# Patient Record
Sex: MALE | Race: WHITE
[De-identification: names, ages, dates, MRNs, and addresses within clinical notes are randomized per-mention and may not be internally consistent; named-entity substitution may affect disease eponyms.]

---

## 2017-04-26 NOTE — CT
EXAM DATE: 17



PATIENT'S AGE: 26





Patient: JOSE ELY



Facility: [place], [place]

Patient ID: [id number]

Site Patient ID: [id number].

Site Accession #: [id number].

: 1990

Study: CT Abdomen/Pelvis [id number]/25/2017 10:12:35 AM

Ordering Physician: Wai Maldonado



Final Report: 

INDICATION: CROHNS F/U



TECHNIQUE:

CT abdomen and pelvis acquired with IV contrast.



COMPARISON:

2015. 



FINDINGS:

Lower chest: Unremarkable. 

Liver: Unremarkable. 

Spleen: Unremarkable. 

Pancreas: Unremarkable. 

Gallbladder and bile ducts: Partially contracted. 

Kidneys: Nonobstructive intrarenal calculus within the upper pole of the left 
kidney. . 

Adrenal glands: Unremarkable. 

GI tract: Postsurgical changes along the ascending colon and rectosigmoid 
colon. Liquid stool throughout the colon. No evidence of obstruction. The 
appendix is not visualized. . 

Vascular structures: Negative. No sign of aneurysm. 

Lymph nodes: Unremarkable. 

Miscellaneous: Postsurgical changes within the ventral abdominal wall. Left fat 
containing periumbilical hernia. Subcutaneous gas within the left ventral 
abdominal wall which may be related to an injection site. Please correlate 
clinically. No free air or significant free fluid. 

Pelvic Organs: Unremarkable. 

Bones: Unremarkable for age. 



IMPRESSION:

1. Liquid stool throughout the colon. No evidence of obstruction. Findings are 
nonspecific, but can be seen with enterocolitis/gastroenteritis. 

2. Nonobstructing shadowing calculus within the upper pole of the left kidney.

3. Subcutaneous gas within the left ventral abdominal wall which may be related 
to an injection site. Please correlate clinically.



Dictated by Darin Blackwell MD @ 2017 1:58:54 AM





Dictated by: Darin Blackwell MD @ 2017 01:59:22

(Electronic Signature)



Report Signed by Proxy and Original Signed Document filed in the

Medical Record.
[facility]

## 2018-04-12 ENCOUNTER — HOSPITAL ENCOUNTER (OUTPATIENT)
Dept: HOSPITAL 56 - MW.SDS | Age: 28
Discharge: HOME | End: 2018-04-12
Attending: OTOLARYNGOLOGY
Payer: COMMERCIAL

## 2018-04-12 VITALS — DIASTOLIC BLOOD PRESSURE: 76 MMHG | SYSTOLIC BLOOD PRESSURE: 121 MMHG

## 2018-04-12 DIAGNOSIS — D18.01: Primary | ICD-10-CM

## 2018-04-12 DIAGNOSIS — Z88.0: ICD-10-CM

## 2018-04-12 DIAGNOSIS — F17.210: ICD-10-CM

## 2018-04-12 DIAGNOSIS — J30.9: ICD-10-CM

## 2018-04-12 PROCEDURE — 31237 NSL/SINS NDSC SURG BX POLYPC: CPT

## 2018-04-12 NOTE — PCM.HPR
H & P Addendum review





- H & P Addendum Review


Date of Original H & P: 03/30/18


Date Reviewed: 04/12/18


Time Reviewed: 07:50


Patient was Examined: No Changes

## 2018-04-12 NOTE — PCM48HPAN
Post Anesthesia Note





- EVALUATION WITHIN 48HRS OF ANESTHETIC


Vital Signs in Normal Range: Yes


Patient Participated in Evaluation: Yes


Respiratory Function Stable: Yes


Airway Patent: Yes


Cardiovascular Function Stable: Yes


Hydration Status Stable: Yes


Pain Control Satisfactory: Yes


Nausea and Vomiting Control Satisfactory: Yes


Mental Status Recovered: Yes


Resp Rate: 16


Temperature: 36.6 C

## 2018-04-12 NOTE — PCM.OPNOTE
- General Post-Op/Procedure Note


Date of Surgery/Procedure: 04/12/18


Operative Procedure(s): Endoscopic excision of Right nasal septal mass


Findings: 





2 cm X 1.5 cm firm pale mass in mid right nasal septum; base at the bony 

cartilaginous junction


Pre Op Diagnosis: Nasal septal hemangioma


Post-Op Diagnosis: Nasal septal hemangioma


Anesthesia Technique: General ET Tube


Primary Surgeon: Leanne Murdock


Anesthesia Provider: Saulo Heath


Pathology: 





Nasal septal mass


Fluid Replacement, Intraop: 400


EBL in mLs: 150


Condition: Good


Free Text/Narrative:: 





Procedure:


An infromed consent was obtained and the patient was brought back to operating 

room; time out was performed and anesthesia administered. A throat pack was 

placed and right nasal cavity was packed with an oxymetazoline 0.05% soaked 

pledget.


Part was prepped and draped in a standard sterile fashion.


A 0 degree nasal endoscope was used to visualize the mass after removal of the 

pledget. 2% lidocaine with 1: 100 000 epinephrine was injected at the base of 

the mass - total of 3 mls was used


Mucosa at the anterior superior aspect of the mass was excised with a number 10 

blade on BP handle. Suction was used to maintain a clear surgical field. 

Dissection over the septum was carried out in a supraperichondrial plane with a 

suction [place]. This was done circumferentially till the thick fibrous 

attachment superiorly - this was excised with a septal scissor. Haemostasis was 

ensured with a bipolar at setting of 10 farley; base of the surgical defect was 

also cauterized. Mass was excised in toto and sent for histopathology.


Surgicel and meropore were placed over the surgical site. This concluded the 

procedure.


Post nasal space was suctioned; throat pack was removed and patient was handed 

over to anesthesia for recovery

## 2018-04-12 NOTE — PCM.POSTAN
POST ANESTHESIA ASSESSMENT





- MENTAL STATUS


Mental Status: Alert, Oriented





- RESPIRATORY


Respiratory Status: Respiratory Rate WNL, Airway Patent, O2 Saturation Stable





- CARDIOVASCULAR


CV Status: Pulse Rate WNL, Blood Pressure Stable





- GASTROINTESTINAL


GI Status: No Symptoms





- PAIN


Pain Score: 5


Free Text/Narrative:: 





patient states does not want any pain medication at this time





- POST OP HYDRATION


Hydration Status: Adequate & Stable

## 2019-04-09 ENCOUNTER — HOSPITAL ENCOUNTER (EMERGENCY)
Dept: HOSPITAL 56 - MW.ED | Age: 29
Discharge: HOME | End: 2019-04-09
Payer: COMMERCIAL

## 2019-04-09 VITALS — SYSTOLIC BLOOD PRESSURE: 121 MMHG | DIASTOLIC BLOOD PRESSURE: 83 MMHG

## 2019-04-09 DIAGNOSIS — Z88.0: ICD-10-CM

## 2019-04-09 DIAGNOSIS — F17.210: ICD-10-CM

## 2019-04-09 DIAGNOSIS — N13.2: Primary | ICD-10-CM

## 2019-04-09 DIAGNOSIS — J45.909: ICD-10-CM

## 2019-04-09 DIAGNOSIS — Z79.899: ICD-10-CM

## 2019-04-09 LAB
CHLORIDE SERPL-SCNC: 108 MMOL/L (ref 98–107)
SODIUM SERPL-SCNC: 144 MMOL/L (ref 136–148)

## 2019-04-09 PROCEDURE — 96372 THER/PROPH/DIAG INJ SC/IM: CPT

## 2019-04-09 PROCEDURE — 83690 ASSAY OF LIPASE: CPT

## 2019-04-09 PROCEDURE — 80053 COMPREHEN METABOLIC PANEL: CPT

## 2019-04-09 PROCEDURE — 74176 CT ABD & PELVIS W/O CONTRAST: CPT

## 2019-04-09 PROCEDURE — 85025 COMPLETE CBC W/AUTO DIFF WBC: CPT

## 2019-04-09 PROCEDURE — 99284 EMERGENCY DEPT VISIT MOD MDM: CPT

## 2019-04-09 PROCEDURE — 81001 URINALYSIS AUTO W/SCOPE: CPT

## 2019-04-09 PROCEDURE — 36415 COLL VENOUS BLD VENIPUNCTURE: CPT

## 2019-04-09 NOTE — EDM.PDOC
ED HPI GENERAL MEDICAL PROBLEM





- General


Chief Complaint: Back Pain or Injury


Stated Complaint: LEFT LOWER BACK PAIN


Time Seen by Provider: 04/09/19 12:09


Source of Information: Reports: Patient


History Limitations: Reports: No Limitations





- History of Present Illness


INITIAL COMMENTS - FREE TEXT/NARRATIVE: 


History of present illness:


[]Patient had an episode of left flank pain last week that resolved 

spontaneously and has been fine until this morning when the pain returned and 

more severe. Straightening to his left side but not to his abdomen or testicle. 

Patient states that he has a history of Crohn's disease but this does not feel 

the same. He denies any fevers, chills, nausea, vomiting, blood in his urine or





Review of systems: 


As per history of present illness and below otherwise all systems reviewed and 

negative.





Past medical history: 


As per history of present illness and as reviewed below otherwise 

noncontributory.





Surgical history: 


As per history of present illness and as reviewed below otherwise 

noncontributory.





Social history: 


No reported history of drug or alcohol abuse.





Family history: 


As per history of present illness and as reviewed below otherwise 

noncontributory.





Physical exam:


General: Well developed, well nourished in NAD


HEENT: Atraumatic, normocephalic, pupils reactive, negative for conjunctival 

pallor or scleral icterus, mucous membranes moist, throat clear, neck supple, 

nontender, trachea midline.


Lungs: Clear to auscultation, breath sounds equal bilaterally, chest nontender.


Heart: S1S2, regular, negative for clicks, rubs, or JVD.


Abdomen: NABS, Soft, nondistended, nontender. Negative for masses or 

hepatosplenomegaly. Negative for costovertebral tenderness.


Pelvis: Stable nontender.


Genitourinary: Deferred.


Rectal: Deferred.


Extremities: Atraumatic, negative for cords or calf pain. Neurovascular 

unremarkable.


Neuro: Awake, alert, oriented. Cranial nerves II through XII unremarkable. 

Cerebellum unremarkable. Motor and sensory unremarkable throughout. Exam 

nonfocal.


Skin:warm and dry





Diagnostics:


CBC, chemistry, lipase, UA





Therapeutics:


Toradol IM





ED Course:


Stable


Consulted Dr. Apodaca after CT showed a 3 x 5 mm obstructing stone with mild 

hydronephrosis will follow up in clinic with the patient





Impression: 


Ureterolithiasis





Prescriptions:


Flomax, diclofenac





Plan:


Drink plenty of fluids take meds as directed follow-up with Dr. Apodaca next 

available appointment.





Definitive disposition and diagnosis as appropriate pending reevaluation and 

review of above.





  ** left back pain


Pain Score (Numeric/FACES): 6





- Related Data


 Allergies











Allergy/AdvReac Type Severity Reaction Status Date / Time


 


Penicillins Allergy  Cannot Verified 04/06/18 10:54





   Remember  











Home Meds: 


 Home Meds





Adalimumab [Humira] 1 dose IM ASDIRECTED 04/06/18 [History]


azaTHIOprine [Azathioprine] 50 mg PO TID 04/06/18 [History]


Diclofenac Sodium [Voltaren] 75 mg PO BIDMEALS PRN #20 tab.cr 04/09/19 [Rx]


Tamsulosin HCl [Flomax] 0.4 mg PO DAILY #14 cap.er.24h 04/09/19 [Rx]











Past Medical History


Other HEENT History: wears glasses


Respiratory History: Reports: Asthma


Other Respiratory History: had asthma as a child, "out grew it" - no inhaler


Gastrointestinal History: Reports: Inflammatory Bowel Disease


Other Gastrointestinal History: Crohns Disease


Musculoskeletal History: Reports: Fracture


Other Musculoskeletal History: states had a fx clavicle during birth


Immunologic History: Reports: Immunosuppression





- Past Surgical History


GI Surgical History: Reports: Colon


Other GI Surgeries/Procedures: has had 2 colon resections


Male  Surgical History: Reports: Vasectomy





Social & Family History





- Family History


Family Medical History: Noncontributory





- Tobacco Use


Smoking Status *Q: Light Tobacco Smoker


Years of Tobacco use: 16


Packs/Tins Daily: 0.1





- Caffeine Use


Caffeine Use: Reports: Coffee, Energy Drinks





- Recreational Drug Use


Recreational Drug Use: Yes


Recreational Drug Type: Reports: Marijuana/Hashish





ED ROS GENERAL





- Review of Systems


Review Of Systems: ROS reveals no pertinent complaints other than HPI.





ED EXAM,LOWER BACK PAIN/INJURY





- Physical Exam


Exam: See Below (See history of present illness)





Course





- Vital Signs


Last Recorded V/S: 


 Last Vital Signs











Temp  97.4 F   04/09/19 12:14


 


Pulse  107 H  04/09/19 12:14


 


Resp  18   04/09/19 12:14


 


BP  141/103 H  04/09/19 12:14


 


Pulse Ox  96   04/09/19 12:14














- Orders/Labs/Meds


Labs: 


 Laboratory Tests











  04/09/19 04/09/19 04/09/19 Range/Units





  12:20 12:46 12:46 


 


WBC   10.98   (4.0-11.0)  K/uL


 


RBC   4.89   (4.50-5.90)  M/uL


 


Hgb   15.9   (13.0-17.0)  g/dL


 


Hct   45.5   (38.0-50.0)  %


 


MCV   93.0   (80.0-98.0)  fL


 


MCH   32.5 H   (27.0-32.0)  pg


 


MCHC   34.9   (31.0-37.0)  g/dL


 


RDW Std Deviation   45.2   (28.0-62.0)  fl


 


RDW Coeff of Giovani   13   (11.0-15.0)  %


 


Plt Count   261   (150-400)  K/uL


 


MPV   8.80   (7.40-12.00)  fL


 


Neut % (Auto)   79.1   (48.0-80.0)  %


 


Lymph % (Auto)   12.0 L   (16.0-40.0)  %


 


Mono % (Auto)   7.7   (0.0-15.0)  %


 


Eos % (Auto)   0.8   (0.0-7.0)  %


 


Baso % (Auto)   0.4   (0.0-1.5)  %


 


Neut # (Auto)   8.7 H   (1.4-5.7)  K/uL


 


Lymph # (Auto)   1.3   (0.6-2.4)  K/uL


 


Mono # (Auto)   0.8   (0.0-0.8)  K/uL


 


Eos # (Auto)   0.1   (0.0-0.7)  K/uL


 


Baso # (Auto)   0.0   (0.0-0.1)  K/uL


 


Nucleated RBC %   0.0   /100WBC


 


Nucleated RBCs #   0   K/uL


 


Sodium    144  (136-148)  mmol/L


 


Potassium    4.0  (3.5-5.1)  mmol/L


 


Chloride    108 H  ()  mmol/L


 


Carbon Dioxide    27.8  (21.0-32.0)  mmol/L


 


BUN    16  (7.0-18.0)  mg/dL


 


Creatinine    1.6 H  (0.8-1.3)  mg/dL


 


Est Cr Clr Drug Dosing    68.74  mL/min


 


Estimated GFR (MDRD)    51.7  ml/min


 


Glucose    93  ()  mg/dL


 


Calcium    9.2  (8.5-10.1)  mg/dL


 


Total Bilirubin    0.7  (0.2-1.0)  mg/dL


 


AST    22  (15-37)  IU/L


 


ALT    30  (14-63)  IU/L


 


Alkaline Phosphatase    77  ()  U/L


 


Total Protein    7.5  (6.4-8.2)  g/dL


 


Albumin    4.1  (3.4-5.0)  g/dL


 


Globulin    3.4  (2.6-4.0)  g/dL


 


Albumin/Globulin Ratio    1.2  (0.9-1.6)  


 


Lipase    136  ()  U/L


 


Urine Color  YELLOW    


 


Urine Appearance  CLEAR    


 


Urine pH  5.5    (5.0-8.0)  


 


Ur Specific Gravity  >= 1.030    (1.001-1.035)  


 


Urine Protein  NEGATIVE    (NEGATIVE)  mg/dL


 


Urine Glucose (UA)  NEGATIVE    (NEGATIVE)  mg/dL


 


Urine Ketones  TRACE H    (NEGATIVE)  mg/dL


 


Urine Occult Blood  LARGE H    (NEGATIVE)  


 


Urine Nitrite  NEGATIVE    (NEGATIVE)  


 


Urine Bilirubin  NEGATIVE    (NEGATIVE)  


 


Urine Urobilinogen  0.2    (<2.0)  EU/dL


 


Ur Leukocyte Esterase  NEGATIVE    (NEGATIVE)  


 


Urine RBC  10-15    (0-2/HPF)  


 


Urine WBC  0-1    (0-5/HPF)  


 


Ur Epithelial Cells  OCCASIONAL    (NONE-FEW)  


 


Urine Bacteria  RARE    (NEGATIVE)  











Meds: 


Medications














Discontinued Medications














Generic Name Dose Route Start Last Admin





  Trade Name Freq  PRN Reason Stop Dose Admin


 


Ketorolac Tromethamine  60 mg  04/09/19 12:18  04/09/19 12:24





  Toradol  IM  04/09/19 12:19  60 mg





  ONETIME ONE   Administration





     





     





     





     














Departure





- Departure


Time of Disposition: 14:15


Disposition: Home, Self-Care 01


Condition: Good


Clinical Impression: 


 Ureterolithiasis








- Discharge Information


*PRESCRIPTION DRUG MONITORING PROGRAM REVIEWED*: No


*COPY OF PRESCRIPTION DRUG MONITORING REPORT IN PATIENT LEATHA: No


Prescriptions: 


Diclofenac Sodium [Voltaren] 75 mg PO BIDMEALS PRN #20 tab.cr


 PRN Reason: Pain


Tamsulosin HCl [Flomax] 0.4 mg PO DAILY #14 cap.er.24h


Referrals: 


Marcela Pulliam NP [Primary Care Provider] - 


Forms:  ED Department Discharge


Additional Instructions: 


The following information is given to patients seen in the emergency department 

who are being discharged to home. This information is to outline your options 

for follow-up care. We provide all patients seen in our emergency department 

with a follow-up referral.





The need for follow-up, as well as the timing and circumstances, are variable 

depending upon the specifics of your emergency department visit.





If you don't have a primary care physician on staff, we will provide you with a 

referral. We always advise you to contact your personal physician following an 

emergency department visit to inform them of the circumstance of the visit and 

for follow-up with them and/or the need for any referrals to a consulting 

specialist.





The emergency department will also refer you to a specialist when appropriate. 

This referral assures that you have the opportunity for follow-up care with a 

specialist. All of these measure are taken in an effort to provide you with 

optimal care, which includes your follow-up.





Under all circumstances we always encourage you to contact your private 

physician who remains a resource for coordinating your care. When calling for 

follow-up care, please make the office aware that this follow-up is from your 

recent emergency room visit. If for any reason you are refused follow-up, 

please contact the [facility] Emergency 

Department at [phone] and asked to speak to the emergency department 

charge nurse.





Take meds as directed, follow up with your primary care physician, return to ER 

if symptoms worsen or change.





[facility]


Specialty Care - Urology


[address] [place] [id number]


Phone: [phone]


Fax: [phone]

## 2019-04-09 NOTE — CT
CT of the abdomen and pelvis without contrast.

 

HISTORY: Pain

 

TECHNIQUE: Axial CT images were obtained of the abdomen and pelvis without

contrast. Coronal and sagittal reconstructions obtained.

 

FINDINGS: 

The lung bases are clear, no pleural effusion.

 

The liver, spleen, adrenal glands, and pancreas appear unremarkable for

noncontrast examination. The gallbladder appears normal.  There is no bulky

retroperitoneal lymphadenopathy. No abdominal ascites.

 

There is a 3 x 5 mm obstructing stone just proximal to the left ureterovesicular

junction with mild proximal hydronephrosis. Punctate nonobstructing left renal

stone.

 

The large and small bowel are normal in caliber without evidence of obstruction.

Anastomosis changes noted near the rectosigmoid junction as well as clips within

the right lower quadrant. There is no bulky pelvic lymphadenopathy. No free

fluid. No free air. The urinary bladder appears normal.

 

The visualized osseous structures appear normal.

 

IMPRESSION: 

 

1. There is an obstructing 3 x 5 mm stone within the distal left ureter with

mild proximal hydronephrosis.

## 2020-03-17 ENCOUNTER — HOSPITAL ENCOUNTER (EMERGENCY)
Dept: HOSPITAL 56 - MW.ED | Age: 30
Discharge: HOME | End: 2020-03-17
Payer: COMMERCIAL

## 2020-03-17 VITALS — DIASTOLIC BLOOD PRESSURE: 83 MMHG | SYSTOLIC BLOOD PRESSURE: 128 MMHG | HEART RATE: 83 BPM

## 2020-03-17 DIAGNOSIS — Z88.0: ICD-10-CM

## 2020-03-17 DIAGNOSIS — Z79.899: ICD-10-CM

## 2020-03-17 DIAGNOSIS — J45.909: ICD-10-CM

## 2020-03-17 DIAGNOSIS — H66.92: Primary | ICD-10-CM

## 2020-03-17 DIAGNOSIS — K50.90: ICD-10-CM

## 2020-03-17 DIAGNOSIS — F17.210: ICD-10-CM

## 2020-03-17 PROCEDURE — 99282 EMERGENCY DEPT VISIT SF MDM: CPT

## 2020-03-17 NOTE — EDM.PDOC
ED HPI GENERAL MEDICAL PROBLEM





- General


Chief Complaint: ENT Problem


Stated Complaint: LT EAR PAIN


Time Seen by Provider: 03/17/20 07:38


Source of Information: Reports: Patient


History Limitations: Reports: No Limitations





- History of Present Illness


Onset: Today


Duration: Day(s): (one to two days)


Location: Reports: Other (left ear pain for one to two days)


Quality: Reports: Pressure, Sharp


Severity: Moderate


Improves with: Reports: None


Worsens with: Reports: None


  ** Left ear


Pain Score (Numeric/FACES): 6





- Related Data


 Allergies











Allergy/AdvReac Type Severity Reaction Status Date / Time


 


Penicillins Allergy  Cannot Verified 03/17/20 07:27





   Remember  











Home Meds: 


 Home Meds





Adalimumab [Humira] 1 dose IM ASDIRECTED 04/06/18 [History]


azaTHIOprine [Azathioprine] 50 mg PO TID 04/06/18 [History]


Azithromycin [Zithromax] 250 mg PO DAILY 10 Days #10 tablet 03/17/20 [Rx]


Dextroamphetamine/Amphetamine [Adderall] 1 dose PO ASDIRECTED 03/17/20 [History]


Ibuprofen [Motrin] 800 mg PO TID PRN 7 Days #21 tablet 03/17/20 [Rx]











Past Medical History


HEENT History: Reports: Other (See Below)


Other HEENT History: wears glasses


Cardiovascular History: Reports: None


Respiratory History: Reports: Asthma


Other Respiratory History: had asthma as a child, "out grew it" - no inhaler


Gastrointestinal History: Reports: Inflammatory Bowel Disease


Other Gastrointestinal History: Crohns Disease


Genitourinary History: Reports: None


Musculoskeletal History: Reports: Fracture


Other Musculoskeletal History: states had a fx clavicle during birth


Neurological History: Reports: None


Psychiatric History: Reports: None


Endocrine/Metabolic History: Reports: None


Hematologic History: Reports: None


Immunologic History: Reports: Immunosuppression


Oncologic (Cancer) History: Reports: None


Dermatologic History: Reports: None





- Infectious Disease History


Infectious Disease History: Reports: None





- Past Surgical History


Head Surgeries/Procedures: Reports: None


HEENT Surgical History: Reports: None


Cardiovascular Surgical History: Reports: None


Respiratory Surgical History: Reports: None


GI Surgical History: Reports: Colon


Other GI Surgeries/Procedures: has had 2 colon resections


Male  Surgical History: Reports: Vasectomy


Endocrine Surgical History: Reports: None


Neurological Surgical History: Reports: None


Musculoskeletal Surgical History: Reports: None


Oncologic Surgical History: Reports: None


Dermatological Surgical History: Reports: None





Social & Family History





- Family History


Family Medical History: Noncontributory





- Tobacco Use


Smoking Status *Q: Current Every Day Smoker


Years of Tobacco use: 12


Packs/Tins Daily: 1





- Caffeine Use


Caffeine Use: Reports: Coffee, Energy Drinks, Soda, Tea





- Recreational Drug Use


Recreational Drug Use: No





ED ROS ENT





- Review of Systems


Review Of Systems: See Below


Constitutional: Reports: No Symptoms


HEENT: Reports: Ear Pain (left ear)


Respiratory: Reports: No Symptoms


Cardiovascular: Reports: No Symptoms


Endocrine: Reports: No Symptoms


GI/Abdominal: Reports: No Symptoms


: Reports: No Symptoms


Musculoskeletal: Reports: No Symptoms


Skin: Reports: No Symptoms





ED EXAM, ENT





- Physical Exam


Exam: See Below


Exam Limited By: No Limitations


General Appearance: Alert, WD/WN, No Apparent Distress


Ears: Normal External Exam, Hearing Grossly Normal, TM Bulging (left ), TM 

Dullness (left), TM Erythema (left).  No: TM Fluid, TM Perforation


Nose: Normal Inspection, Normal Mucousa, No Blood


Mouth/Throat: Normal Inspection, Normal Gums, Normal Lips, Normal Oropharynx, 

Normal Teeth


Head: Atraumatic, Normocephalic


Neck: Normal Inspection, Supple, Non-Tender, Full Range of Motion


Respiratory/Chest: No Respiratory Distress, Lungs Clear, Normal Breath Sounds, 

No Accessory Muscle Use, Chest Non-Tender


Cardiovascular: Normal Peripheral Pulses, Regular Rate, Rhythm, No Edema, No 

Gallop, No JVD, No Murmur, No Rub


GI/Abdominal: Normal Bowel Sounds, Soft, Non-Tender


 (Male) Exam: Deferred


Rectal (Males) Exam: Deferred


Back: Normal Inspection


Extremities: Normal Inspection, Normal Range of Motion


Skin: Warm, Dry, Intact, Normal Color, No Rash


Lymphatic: No Adenopathy





Course





- Vital Signs


Text/Narrative:: 





Left otitis media noted.  I discussed with the patient his diagnosis of Left 

Otitis Media.  He will be discharged.  He agrees with the discharge plan.





Last Recorded V/S: 





 Last Vital Signs











Temp  97.3 F   03/17/20 07:28


 


Pulse  83   03/17/20 07:28


 


Resp  18   03/17/20 07:28


 


BP  128/83   03/17/20 07:28


 


Pulse Ox  97   03/17/20 07:28














Departure





- Departure


Time of Disposition: 08:12


Disposition: Home, Self-Care 01


Condition: Good


Clinical Impression: 


Otitis media of left ear


Qualifiers:


 Otitis media type: unspecified Qualified Code(s): H66.92 - Otitis media, 

unspecified, left ear








- Discharge Information


*PRESCRIPTION DRUG MONITORING PROGRAM REVIEWED*: Yes


*COPY OF PRESCRIPTION DRUG MONITORING REPORT IN PATIENT LEATHA: Yes


Instructions:  Otitis Media, Adult


Referrals: 


Yessi Alves PA [Primary Care Provider] - 


Additional Instructions: 


Take all medications as directed.  Follow up with your PCP in the next two to 

four days.  Drink plenty of clear liquids for the next 24 hours.  Rest for the 

next 24 hours.  Return to the ED if your condition gets worse or should you 

have any questions or concerns.





The following information is given to patients seen in the emergency department 

who are being discharged to home. This information is to outline your options 

for follow-up care. We provide all patients seen in our emergency department 

with a follow-up referral.





The need for follow-up, as well as the timing and circumstances, are variable 

depending upon the specifics of your emergency department visit.





If you don't have a primary care physician on staff, we will provide you with a 

referral. We always advise you to contact your personal physician following an 

emergency department visit to inform them of the circumstance of the visit and 

for follow-up with them and/or the need for any referrals to a consulting 

specialist.





The emergency department will also refer you to a specialist when appropriate. 

This referral assures that you have the opportunity for follow-up care with a 

specialist. All of these measure are taken in an effort to provide you with 

optimal care, which includes your follow-up.





Under all circumstances we always encourage you to contact your private 

physician who remains a resource for coordinating your care. When calling for 

follow-up care, please make the office aware that this follow-up is from your 

recent emergency room visit. If for any reason you are refused follow-up, 

please contact the [facility] Emergency 

Department at [phone] and asked to speak to the emergency department 

charge nurse.





Sepsis Event Note





- Evaluation


Sepsis Screening Result: No Definite Risk





- Focused Exam


Vital Signs: 





 Vital Signs











  Temp Pulse Resp BP Pulse Ox


 


 03/17/20 07:28  97.3 F  83  18  128/83  97











Date Exam was Performed: 03/17/20


Time Exam was Performed: 07:38

## 2020-09-12 ENCOUNTER — HOSPITAL ENCOUNTER (EMERGENCY)
Dept: HOSPITAL 56 - MW.ED | Age: 30
Discharge: HOME | End: 2020-09-12
Payer: COMMERCIAL

## 2020-09-12 VITALS — SYSTOLIC BLOOD PRESSURE: 139 MMHG | HEART RATE: 100 BPM | DIASTOLIC BLOOD PRESSURE: 87 MMHG

## 2020-09-12 DIAGNOSIS — Z23: ICD-10-CM
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
## 2020-09-12 DIAGNOSIS — Z88.0: ICD-10-CM

## 2020-09-12 DIAGNOSIS — Z79.899: ICD-10-CM

## 2020-09-12 DIAGNOSIS — J45.909: ICD-10-CM

## 2020-09-12 DIAGNOSIS — S61.211A: Primary | ICD-10-CM

## 2020-09-12 PROCEDURE — 99282 EMERGENCY DEPT VISIT SF MDM: CPT

## 2020-09-12 PROCEDURE — 12002 RPR S/N/AX/GEN/TRNK2.6-7.5CM: CPT

## 2020-09-12 PROCEDURE — 90471 IMMUNIZATION ADMIN: CPT

## 2020-09-12 PROCEDURE — 90715 TDAP VACCINE 7 YRS/> IM: CPT

## 2020-09-12 NOTE — EDM.PDOC
<Minal Covington E - Last Filed: 09/12/20 12:01>





ED HPI GENERAL MEDICAL PROBLEM





- General


Chief Complaint: Laceration


Stated Complaint: CUT LT POINTER FINGER


Time Seen by Provider: 09/12/20 11:09





- Related Data


                                    Allergies











Allergy/AdvReac Type Severity Reaction Status Date / Time


 


Penicillins Allergy  Cannot Verified 09/12/20 11:20





   Remember  











Home Meds: 


                                    Home Meds





Adalimumab [Humira] 1 dose IM ASDIRECTED 04/06/18 [History]


azaTHIOprine [Azathioprine] 50 mg PO DAILY 04/06/18 [History]


Dextroamphetamine/Amphetamine [Adderall 10 mg Tablet] 1 tab PO DAILY PRN 

09/12/20 [History]


buPROPion HCL [Bupropion Xl] 300 mg PO DAILY 09/12/20 [History]











ED SKIN PROCEDURES





- Laceration/Wound Repair


  ** Left index finger


Appearance: Subcutaneous, Linear, Clean


Distal NVT: Neuro & Vascular Intact, No Tendon Injury


Anesthetic Type: Local


Local Anesthesia - Lidocaine (Xylocaine): 1% Plain


Local Anesthetic Volume: 2cc


Skin Prep: Chlorhexidine (Hibiciens), Saline, Sterile Drape


Saline Irrigation (cc's): 500


Exploration/Debridement/Repair: Wound Explored, In a Bloodless Field, Explored 

to Base, No Foreign Material Found


Closed with: Sutures


Lac/Wound length In cm: 3


Suture Size: 4-0


# of Sutures: 7


Suture Type: Nylon, Interrupted, Simple


Drain Placement: No


Sterile Dressing Applied: Provider


Tetanus Status Addressed: Yes


Complications: No


Progress/Comments: 


1% lidocaine was used to anesthetize the area.  Area was thoroughly cleansed 

with chlorhexidine and wound wash.  Usual and customary procedures were followed

 for suture placement.  4-0 chromic, #1 interrupted suture was placed internally

 to close off a small vessel that was bleeding.  This was done successfully.  4-

0 nylon, #6 interrupted sutures were placed.  Nonstick dressing applied.  

Patient was monitored for 15 to 20 minutes to make sure there was no 

breakthrough bleeding.





Departure





- Departure


Disposition: Home, Self-Care 01


Clinical Impression: 


Finger laceration


Qualifiers:


 Encounter type: initial encounter Finger: index finger Damage to nail status: 

without damage Foreign body presence: without foreign body Laterality: left 

Qualified Code(s): S61.211A - Laceration without foreign body of left index 

finger without damage to nail, initial encounter








- Discharge Information


Instructions:  Laceration Care, Adult, Easy-to-Read, Wound Care, Adult, Sutures,

 Staples, or Adhesive Wound Closure, Easy-to-Read


Referrals: 


Asim Pedro [Primary Care Provider] - 


Carlyn Berumen [Ordering Only Provider] - 2 Days


Forms:  ED Department Discharge


Additional Instructions: 


Please follow-up with the hand surgeon listed above or the orthopedic clinic 

listed below for follow-up since you have some diminished sensation over the tip

of your finger and to ensure appropriate healing and for suture removal.  Please

follow-up with them as soon as possible.  You will need to have the sutures 

removed in the next 7 to 10 days.  Keep the wound clean and dry.  Keep it 

completely dry for the next 24 hours and then you may gently wash with soap and 

water but do not submerge.








The following information is given to patients seen in the emergency department 

who are being discharged to home. This information is to outline your options 

for follow-up care. We provide all patients seen in our emergency department 

with a follow-up referral.





The need for follow-up, as well as the timing and circumstances, are variable 

depending upon the specifics of your emergency department visit.





If you don't have a primary care physician on staff, we will provide you with a 

referral. We always advise you to contact your personal physician following an 

emergency department visit to inform them of the circumstance of the visit and 

for follow-up with them and/or the need for any referrals to a consulting 

specialist.





The emergency department will also refer you to a specialist when appropriate. 

This referral assures that you have the opportunity for follow-up care with a 

specialist. All of these measure are taken in an effort to provide you with 

optimal care, which includes your follow-up.





Under all circumstances we always encourage you to contact your private 

physician who remains a resource for coordinating your care. When calling for 

follow-up care, please make the office aware that this follow-up is from your 

recent emergency room visit. If for any reason you are refused follow-up, please

contact the [facility] Emergency Department

at [phone] and asked to speak to the emergency department charge nurse.





[facility] - Orthopedic Clinic


[address]


[address] [place] [address], [address]


[place] [address]


Phone: [phone]


Fax: [phone]








<Kristi Strickland - Last Filed: 09/12/20 12:59>





ED HPI GENERAL MEDICAL PROBLEM





- General


Source of Information: Reports: Patient





- History of Present Illness


INITIAL COMMENTS - FREE TEXT/NARRATIVE: 





History of present illness:


30-year-old male presenting with left index finger laceration over the palmar 

and medial surface of the distal index finger with active bleeding.  Apparently 

he was attempting to use a knife to cut through some plastic Tupperware when the

knife pushed through the Tupperware and cut through his finger.  He is not sure 

of his last tetanus shot.  He does report some numbness distal to the 

laceration.  He is able to move the finger without any difficulty.  No other 

injury sustained.  Patient is right-hand dominant.





Review of systems: 


As per history of present illness and below otherwise all systems reviewed and 

negative.





Past medical history: 


As per history of present illness and as reviewed below otherwise 

noncontributory.  Crohn's disease





Surgical history: 


As per history of present illness and as reviewed below otherwise 

noncontributory.  Colon resection





Social history: 


No reported history of drug or alcohol abuse.  Daily smoker with electronic 

cigarette





Family history: 


As per history of present illness and as reviewed below otherwise nonco

ntributory.





Physical exam:


GEN: no acute distress, well appearing


HEENT: Atraumatic, normocephalic, mucous membranes moist, 


Neck: supple.


Lungs: No respiratory distress.


Heart: RRR


Extremities: Left index finger laceration over the palmar and medial surface 

total about 3 cm with active bleeding and small pumping blood vessel.  It 

appears clean without any contamination.  The patient does have full and intact 

motor strength both with extension and flexion of the finger.  He is able to 

make a good strong fist.  He does have some decreased sensation over the tip of 

the finger distal to the laceration.  Normal cap refill time.  The remainder of 

the hand is unremarkable.  The remainder of the extremity is unremarkable as 

well.  Right upper extremity unremarkable.


Neuro: Awake, alert, oriented. Neuro Exam nonfocal.


Skin: warm, dry, no lesions.  3 cm laceration of the left index finger as above.





Diagnostics:


Not indicated





Therapeutics:


Tetanus, lidocaine





MDM: 





Impression: 


[]





Plan:


[]





Definitive disposition and diagnosis as appropriate pending reevaluation and 

review of above.








  ** Left Hand


Pain Score (Numeric/FACES): 4





Past Medical History


HEENT History: Reports: Other (See Below)


Other HEENT History: wears glasses


Cardiovascular History: Reports: None


Respiratory History: Reports: Asthma


Other Respiratory History: had asthma as a child, "out grew it" - no inhaler


Gastrointestinal History: Reports: Inflammatory Bowel Disease


Other Gastrointestinal History: Crohns Disease


Genitourinary History: Reports: None


Musculoskeletal History: Reports: Fracture


Other Musculoskeletal History: states had a fx clavicle during birth


Neurological History: Reports: None


Psychiatric History: Reports: None


Endocrine/Metabolic History: Reports: None


Hematologic History: Reports: None


Immunologic History: Reports: Immunosuppression


Oncologic (Cancer) History: Reports: None


Dermatologic History: Reports: None





- Infectious Disease History


Infectious Disease History: Reports: None





- Past Surgical History


Head Surgeries/Procedures: Reports: None


HEENT Surgical History: Reports: None


Cardiovascular Surgical History: Reports: None


Respiratory Surgical History: Reports: None


GI Surgical History: Reports: Colon


Other GI Surgeries/Procedures: has had 2 colon resections


Male  Surgical History: Reports: Vasectomy


Endocrine Surgical History: Reports: None


Neurological Surgical History: Reports: None


Musculoskeletal Surgical History: Reports: None


Oncologic Surgical History: Reports: None


Dermatological Surgical History: Reports: None





Social & Family History





- Family History


Family Medical History: Noncontributory





- Caffeine Use


Caffeine Use: Reports: Coffee, Energy Drinks, Soda, Tea





ED ROS GENERAL





- Review of Systems


Review Of Systems: See Below (See HPI)





ED EXAM, SKIN/RASH


Exam: See Below (See HPI)


Exam Limited By: No Limitations


General Appearance: Alert, WD/WN, No Apparent Distress


Ears: Normal External Exam, Normal Canal, Hearing Grossly Normal, Normal TMs


Nose: Normal Inspection, Normal Mucosa, No Blood


Throat/Mouth: Normal Inspection, Normal Lips, Normal Teeth, Normal Gums, Normal 

Oropharynx, Normal Voice, No Airway Compromise


Head: Atraumatic, Normocephalic


Neck: Normal Inspection, Supple, Non-Tender, Full Range of Motion


Respiratory/Chest: No Respiratory Distress, Lungs Clear, Normal Breath Sounds, 

No Accessory Muscle Use, Chest Non-Tender


Cardiovascular: Normal Peripheral Pulses, Regular Rate, Rhythm, No Edema, No 

Gallop, No JVD, No Murmur, No Rub


GI/Abdominal: Normal Bowel Sounds, Soft, Non-Tender, No Organomegaly, No 

Distention, No Abnormal Bruit, No Mass


 (Male) Exam: No Hernia, Normal Inspection, Normal Prostate, Circumcised


Rectal (Males) Exam: Normal Exam, Normal Rectal Tone, Prostate Normal


Back Exam: Normal Inspection, Full Range of Motion, NT


Extremities: Normal Inspection, Normal Range of Motion, Non-Tender, No Pedal 

Edema, Normal Capillary Refill


Neurological: Alert, Oriented, CN II-XII Intact, Normal Cognition, Normal Gait, 

Normal Reflexes, No Motor/Sensory Deficits


Psychiatric: Normal Affect, Normal Mood


Lymphatic: No Adenopathy





Course





- Vital Signs


Text/Narrative:: 





Index finger laceration with mild decreased sensation distal to the laceration, 

suspect injury to superficial dermal nerve though intact blood flow to the area.


Full range of motion with good strength and no tendon injury.


Last Recorded V/S: 


                                Last Vital Signs











Temp  96.8 F L  09/12/20 11:22


 


Pulse  100   09/12/20 11:22


 


Resp  17   09/12/20 11:22


 


BP  139/87   09/12/20 11:22


 


Pulse Ox  96   09/12/20 11:22














- Orders/Labs/Meds


Orders: 


                               Active Orders 24 hr











 Category Date Time Status


 


 Vaccines to be Administered [RC] PER UNIT ROUTINE Care  09/12/20 11:26 Active











Meds: 


Medications














Discontinued Medications














Generic Name Dose Route Start Last Admin





  Trade Name Beata  PRN Reason Stop Dose Admin


 


Diphtheria/Tetanus/Acell Pertussis  0.5 ml  09/12/20 11:26  09/12/20 11:31





  Adacel  IM  09/12/20 11:27  0.5 ml





  .ONCE ONE   Administration


 


Lidocaine HCl  Confirm  09/12/20 11:28  09/12/20 11:32





  Xylocaine-Mpf 1%  Administered  09/12/20 11:29  Not Given





  Dose  





  2 mls @ as directed  





  .ROUTE  





  .STK-MED ONE  


 


Lidocaine HCl  2 ml  09/12/20 11:26  09/12/20 11:31





  Xylocaine-Mpf 1%  INJECT  09/12/20 11:27  2 ml





  ONETIME ONE   Administration














- Re-Assessments/Exams


Free Text/Narrative Re-Assessment/Exam: 





09/12/20 12:42


Patient tolerated suturing well.


Compression dressing was placed after completion of suturing, on reassessment 

after removing the compression dressing, the patient has no active bleeding.  He

 has full and intact range of motion with no limitation.  Minimal ooze of blood 

after range of motion testing attempted, easily controlled.  Will replace with 

gauze/Kerlix dressing.  Discussed with patient plan for referral to hand surgery

 or orthopedics.  Will give both hand surgeon and [place] and orthopedic clinic 

here for patient to be evaluated soon as possible.  Suture removal discussed 

with patient as well.  Return instructions discussed.


The patient is feeling well and in no acute distress.


Please see Tiah's note for full suture procedure documentation.











Departure





- Departure


Time of Disposition: 12:45





Sepsis Event Note (ED)





- Focused Exam


Vital Signs: 


                                   Vital Signs











  Temp Pulse Resp BP Pulse Ox


 


 09/12/20 11:22  96.8 F L  100  17  139/87  96

## 2022-08-26 ENCOUNTER — HOSPITAL ENCOUNTER (EMERGENCY)
Dept: HOSPITAL 56 - MW.ED | Age: 32
Discharge: HOME | End: 2022-08-26
Payer: SELF-PAY

## 2022-08-26 VITALS — HEART RATE: 81 BPM | SYSTOLIC BLOOD PRESSURE: 128 MMHG | DIASTOLIC BLOOD PRESSURE: 81 MMHG

## 2022-08-26 DIAGNOSIS — Z79.899: ICD-10-CM

## 2022-08-26 DIAGNOSIS — Z88.0: ICD-10-CM

## 2022-08-26 DIAGNOSIS — N23: Primary | ICD-10-CM

## 2022-08-26 LAB
BUN SERPL-MCNC: 14 MG/DL (ref 7–18)
CHLORIDE SERPL-SCNC: 104 MMOL/L (ref 98–107)
CO2 SERPL-SCNC: 27.6 MMOL/L (ref 21–32)
EGFRCR SERPLBLD CKD-EPI 2021: 83 ML/MIN (ref 60–?)
GLUCOSE SERPL-MCNC: 113 MG/DL (ref 74–106)
POTASSIUM SERPL-SCNC: 3.6 MMOL/L (ref 3.5–5.1)
SODIUM SERPL-SCNC: 141 MMOL/L (ref 136–148)

## 2022-08-26 PROCEDURE — 96375 TX/PRO/DX INJ NEW DRUG ADDON: CPT

## 2022-08-26 PROCEDURE — 36415 COLL VENOUS BLD VENIPUNCTURE: CPT

## 2022-08-26 PROCEDURE — 85025 COMPLETE CBC W/AUTO DIFF WBC: CPT

## 2022-08-26 PROCEDURE — 96361 HYDRATE IV INFUSION ADD-ON: CPT

## 2022-08-26 PROCEDURE — 81001 URINALYSIS AUTO W/SCOPE: CPT

## 2022-08-26 PROCEDURE — 99284 EMERGENCY DEPT VISIT MOD MDM: CPT

## 2022-08-26 PROCEDURE — 74176 CT ABD & PELVIS W/O CONTRAST: CPT

## 2022-08-26 PROCEDURE — 80053 COMPREHEN METABOLIC PANEL: CPT

## 2022-08-26 PROCEDURE — 96374 THER/PROPH/DIAG INJ IV PUSH: CPT
